# Patient Record
Sex: MALE | Race: WHITE
[De-identification: names, ages, dates, MRNs, and addresses within clinical notes are randomized per-mention and may not be internally consistent; named-entity substitution may affect disease eponyms.]

---

## 2018-02-25 ENCOUNTER — HOSPITAL ENCOUNTER (EMERGENCY)
Dept: HOSPITAL 74 - JER | Age: 83
Discharge: HOME | End: 2018-02-25
Payer: COMMERCIAL

## 2018-02-25 VITALS — TEMPERATURE: 99.7 F

## 2018-02-25 VITALS — DIASTOLIC BLOOD PRESSURE: 60 MMHG | SYSTOLIC BLOOD PRESSURE: 123 MMHG | HEART RATE: 74 BPM

## 2018-02-25 VITALS — BODY MASS INDEX: 24.9 KG/M2

## 2018-02-25 DIAGNOSIS — I25.10: ICD-10-CM

## 2018-02-25 DIAGNOSIS — Z95.1: ICD-10-CM

## 2018-02-25 DIAGNOSIS — E78.00: ICD-10-CM

## 2018-02-25 DIAGNOSIS — I10: ICD-10-CM

## 2018-02-25 DIAGNOSIS — J45.901: Primary | ICD-10-CM

## 2018-02-25 LAB
ALBUMIN SERPL-MCNC: 4.2 G/DL (ref 3.4–5)
ALP SERPL-CCNC: 85 U/L (ref 45–117)
ALT SERPL-CCNC: 20 U/L (ref 12–78)
ANION GAP SERPL CALC-SCNC: 8 MMOL/L (ref 8–16)
AST SERPL-CCNC: 22 U/L (ref 15–37)
BASOPHILS # BLD: 1 % (ref 0–2)
BILIRUB SERPL-MCNC: 0.6 MG/DL (ref 0.2–1)
BUN SERPL-MCNC: 15 MG/DL (ref 7–18)
CALCIUM SERPL-MCNC: 8.2 MG/DL (ref 8.5–10.1)
CHLORIDE SERPL-SCNC: 105 MMOL/L (ref 98–107)
CO2 SERPL-SCNC: 27 MMOL/L (ref 21–32)
CREAT SERPL-MCNC: 1.7 MG/DL (ref 0.7–1.3)
DEPRECATED RDW RBC AUTO: 13.7 % (ref 11.9–15.9)
EOSINOPHIL # BLD: 2.2 % (ref 0–4.5)
GLUCOSE SERPL-MCNC: 90 MG/DL (ref 74–106)
HCT VFR BLD CALC: 39.4 % (ref 35.4–49)
HGB BLD-MCNC: 12.7 GM/DL (ref 11.7–16.9)
LYMPHOCYTES # BLD: 11.2 % (ref 8–40)
MCH RBC QN AUTO: 29.2 PG (ref 25.7–33.7)
MCHC RBC AUTO-ENTMCNC: 32.1 G/DL (ref 32–35.9)
MCV RBC: 91 FL (ref 80–96)
MONOCYTES # BLD AUTO: 17.6 % (ref 3.8–10.2)
NEUTROPHILS # BLD: 68 % (ref 42.8–82.8)
PLATELET # BLD AUTO: 149 K/MM3 (ref 134–434)
PMV BLD: 10.7 FL (ref 7.5–11.1)
POTASSIUM SERPLBLD-SCNC: 4.9 MMOL/L (ref 3.5–5.1)
PROT SERPL-MCNC: 7.8 G/DL (ref 6.4–8.2)
RBC # BLD AUTO: 4.33 M/MM3 (ref 4–5.6)
SODIUM SERPL-SCNC: 140 MMOL/L (ref 136–145)
WBC # BLD AUTO: 6.5 K/MM3 (ref 4–10)

## 2018-02-25 PROCEDURE — 3E0F7GC INTRODUCTION OF OTHER THERAPEUTIC SUBSTANCE INTO RESPIRATORY TRACT, VIA NATURAL OR ARTIFICIAL OPENING: ICD-10-PCS | Performed by: STUDENT IN AN ORGANIZED HEALTH CARE EDUCATION/TRAINING PROGRAM

## 2018-02-25 NOTE — PDOC
Attending Attestation





- Resident


Resident Name: SamuelventuramairaJuvenalNavneet





- ED Attending Attestation


I have performed the following: I have examined & evaluated the patient, The 

case was reviewed & discussed with the resident, I agree w/resident's findings 

& plan, Exceptions are as noted





- HPI


HPI: 





02/25/18 07:35


82y M from [place] , pmhx htn, hl, cad sp cabg, asthmapresents with 2 day 

history of cough, no spuutm production, body aches, fever/chills, congestion, 

leg swelling, hemoptysis, dias, orthopnea. No sick contacts but endorses recent 

travel from [place].  





On exam pt well appearing in no distress


scattered wheez on pulm exam


cardiac: 5/6 systolic murmer, r r r


abd soft nontender


ext: neg edema, no calf tenderness, neg homans





suspect viral syndrome














- Physicial Exam


PE: 





02/25/18 16:18


see above





- Medical Decision Making





02/25/18 09:34


pt feeling improved


abmulating around the ED without any sob or dias


will dc with steroid burst and albuterol


pmd fu


return precautions were discussed





I discussed the physical exam findings, ancillary test results and final 

diagnoses with the patient. I answered all of the patient's questions. The 

patient was satisfied with the care received and felt comfortable with the 

discharge plan and treatment plan. The patient will call their primary care 

physician within 24 hours to arrange follow-up and will return to the Emergency 

Department with any new, persistent or worsening symptoms. 








**Heart Score/ECG Review





- ECG Impressions


Comment:: 





02/25/18 09:34


Twelve-lead EKG was performed and reviewed by me. 


There is normal sinus rhythm with a normal rate. 


Rate of 74


The axis is normal. 


The intervals are normal. 


There is normal R wave progression


Nonspecific T wave abnormality

## 2018-02-25 NOTE — EKG
Test Reason : 

Blood Pressure : ***/*** mmHG

Vent. Rate : 074 BPM     Atrial Rate : 074 BPM

   P-R Int : 190 ms          QRS Dur : 066 ms

    QT Int : 364 ms       P-R-T Axes : 071 034 092 degrees

   QTc Int : 404 ms

 

NORMAL SINUS RHYTHM

NONSPECIFIC T WAVE ABNORMALITY

ABNORMAL ECG

WHEN COMPARED WITH ECG OF 16-APR-2009 15:10,

NO SIGNIFICANT CHANGE WAS FOUND

Confirmed by MD ERNIE, CAMERON ([id number]) on 2/25/2018 6:33:48 PM

 

Referred By:             Confirmed By:CAMERON KLEIN MD

## 2018-02-25 NOTE — PDOC
History of Present Illness





- General


Chief Complaint: Respiratory


Stated Complaint: COUGH


Time Seen by Provider: 02/25/18 07:12





- History of Present Illness


Initial Comments: 





02/25/18 07:12


Mr. Luong is an 83 yo male w/ pmh of HTN, HLD, asthma, and CABG (

approx. 15 years ago) who presents c/o a 2 day history of cough. He denies any 

sputum production but says that he came in because he was up all last night 

coughing. Patient also reports that he has run out of his inhaler medication.





The patient denies chest pain, shortness of breath, headache and dizziness. 

Denies fever, chills, nausea, vomit, diarrhea and constipation. Denies dysuria, 

frequency, urgency and hematuria. 





Allergies: NKDA








Past History





- Past Medical History


Allergies/Adverse Reactions: 


 Allergies











Allergy/AdvReac Type Severity Reaction Status Date / Time


 


No Known Allergies Allergy   Verified 02/25/18 06:59











Home Medications: 


Ambulatory Orders





Albuterol Sulfate Inhaler - [Ventolin Hfa Inhaler -] 1 puff IH PRN PRN #1 

inhaler 02/25/18 


Clopidogrel Bisulfate [Plavix] 75 mg PO DAILY 02/25/18 


Hydrochlorothiazide 12.5 mg PO DAILY 02/25/18 


Montelukast Sodium [Singulair] 10 mg PO DAILY 02/25/18 


Prednisone [Deltasone] 40 mg PO DAILY 4 Days #8 tablet 02/25/18 


Simvastatin 40 mg PO DAILY 02/25/18 











- Suicide/Smoking/Psychosocial Hx


Smoking History: Never smoked


Have you smoked in the past 12 months: No


Information on smoking cessation initiated: No


Hx Alcohol Use: No


Drug/Substance Use Hx: No





**Review of Systems





- Review of Systems


Comments:: 





02/25/18 07:44


GENERAL/CONSTITUTIONAL: No fever or chills. No weakness.


HEAD, EYES, EARS, NOSE AND THROAT: No change in vision. No ear pain or 

discharge. No sore throat.


CARDIOVASCULAR: No chest pain or shortness of breath


RESPIRATORY: +Cough as described. No wheezing or hemoptysis.


GASTROINTESTINAL: No nausea, vomiting, diarrhea or constipation.


GENITOURINARY: No dysuria, frequency, or change in urination.


MUSCULOSKELETAL: No joint or muscle swelling or pain. No neck or back pain.


SKIN: No rash


NEUROLOGIC: No headache, vertigo, loss of consciousness, or change in strength/

sensation.


ENDOCRINE: No increased thirst. No abnormal weight change


HEMATOLOGIC/LYMPHATIC: No anemia, easy bleeding, or history of blood clots.


ALLERGIC/IMMUNOLOGIC: No hives or skin allergy.





*Physical Exam





- Vital Signs


 Last Vital Signs











Temp Pulse Resp BP Pulse Ox


 


 99.7 F H  69   22   159/84   97 


 


 02/25/18 06:57  02/25/18 06:57  02/25/18 06:57  02/25/18 06:57  02/25/18 06:57














- Physical Exam


Comments: 





02/25/18 07:44


GENERAL: Awake, alert, and fully oriented, in no acute distress


HEAD: No signs of trauma, normocephalic, atraumatic 


EYES: PERRLA, EOMI, sclera anicteric, conjunctiva clear


ENT: Auricles normal inspection, hearing grossly normal, nares patent, 

oropharynx clear without


exudates. Moist mucosa


NECK: Normal ROM, supple, no lymphadenopathy, JVD, or masses


LUNGS: +Mild tightness noted in lungs. Speaks full sentences


HEART: Regular rate and rhythm, normal S1 and S2, no murmurs, rubs or gallops, 

peripheral pulses normal and equal bilaterally. 


ABDOMEN: Soft, nontender, normoactive bowel sounds. No guarding, no rebound. No 

masses


EXTREMITIES: Normal inspection, Normal range of motion, no edema. No clubbing 

or cyanosis. 


NEUROLOGICAL: Cranial nerves II through XII grossly intact. Normal speech, 

normal gait, no focal sensorimotor deficits 


SKIN: Warm, Dry, normal turgor, no rashes or lesions noted. 





ED Treatment Course





- LABORATORY


CBC & Chemistry Diagram: 


 02/25/18 07:48





 02/25/18 07:48





Medical Decision Making





- Medical Decision Making





02/25/18 09:23


Mr. Luong is an 83 yo male w/ pmh as described presenting with acute 

viral asthma exacerbation. Patient grabs grossly wnl as below; CXR shows no 

acute pathology. Patient currently oxygenating well after single duo-neb 

treatment.





02/25/18 09:37


Prednisone burst therapy and new Rx for rescue inhaler sent to patient's 

pharmacy. Patient now able to ambulate without difficulty or SOB throughout 

unit. Discharging to home w/ instructions to f/u w/ PCP for further care. 

Patient verbalized understanding and agreement and will comply.





*DC/Admit/Observation/Transfer


Diagnosis at time of Disposition: 


 Cough








- Discharge Dispostion


Disposition: HOME


Condition at time of disposition: Fair





- Prescriptions


Prescriptions: 


Albuterol Sulfate Inhaler - [Ventolin Hfa Inhaler -] 1 puff IH PRN PRN #1 

inhaler


 PRN Reason: Dyspnea


Prednisone [Deltasone] 40 mg PO DAILY 4 Days #8 tablet





- Referrals


Referrals: 


ON STAFF,NOT [Primary Care Provider] - 


Luis Alberto Coe MD [Staff Physician] - 





- Patient Instructions


Printed Discharge Instructions:  DI for Asthma -- Adult, DI for Cough -- Adult


Additional Instructions: 


Please return if any difficulty breathing or continued cough. Follow-up with 

primary care physician as needed for further evaluation. Take prescriptions as 

written for relief from symptoms.





- Post Discharge Activity

## 2019-04-02 ENCOUNTER — HOSPITAL ENCOUNTER (OUTPATIENT)
Dept: HOSPITAL 74 - JER | Age: 84
Setting detail: OBSERVATION
LOS: 1 days | Discharge: HOME | End: 2019-04-03
Attending: INTERNAL MEDICINE | Admitting: INTERNAL MEDICINE
Payer: COMMERCIAL

## 2019-04-02 VITALS — BODY MASS INDEX: 27.3 KG/M2

## 2019-04-02 DIAGNOSIS — R05: ICD-10-CM

## 2019-04-02 DIAGNOSIS — Z88.0: ICD-10-CM

## 2019-04-02 DIAGNOSIS — J06.9: ICD-10-CM

## 2019-04-02 DIAGNOSIS — Z87.891: ICD-10-CM

## 2019-04-02 DIAGNOSIS — I25.10: ICD-10-CM

## 2019-04-02 DIAGNOSIS — Z95.5: ICD-10-CM

## 2019-04-02 DIAGNOSIS — Z95.1: ICD-10-CM

## 2019-04-02 DIAGNOSIS — N40.0: ICD-10-CM

## 2019-04-02 DIAGNOSIS — J45.909: ICD-10-CM

## 2019-04-02 DIAGNOSIS — I48.91: Primary | ICD-10-CM

## 2019-04-02 DIAGNOSIS — E78.5: ICD-10-CM

## 2019-04-02 DIAGNOSIS — R79.89: ICD-10-CM

## 2019-04-02 DIAGNOSIS — N18.3: ICD-10-CM

## 2019-04-02 DIAGNOSIS — I12.9: ICD-10-CM

## 2019-04-02 LAB
ALBUMIN SERPL-MCNC: 3.7 G/DL (ref 3.4–5)
ALP SERPL-CCNC: 98 U/L (ref 45–117)
ALT SERPL-CCNC: 25 U/L (ref 13–61)
ANION GAP SERPL CALC-SCNC: 7 MMOL/L (ref 8–16)
AST SERPL-CCNC: 24 U/L (ref 15–37)
BASOPHILS # BLD: 1 % (ref 0–2)
BILIRUB SERPL-MCNC: 0.4 MG/DL (ref 0.2–1)
BUN SERPL-MCNC: 13 MG/DL (ref 7–18)
CALCIUM SERPL-MCNC: 9.1 MG/DL (ref 8.5–10.1)
CHLORIDE SERPL-SCNC: 104 MMOL/L (ref 98–107)
CO2 SERPL-SCNC: 26 MMOL/L (ref 21–32)
CREAT SERPL-MCNC: 1.4 MG/DL (ref 0.55–1.3)
DEPRECATED RDW RBC AUTO: 13.2 % (ref 11.9–15.9)
EOSINOPHIL # BLD: 2 % (ref 0–4.5)
GLUCOSE SERPL-MCNC: 92 MG/DL (ref 74–106)
HCT VFR BLD CALC: 39.4 % (ref 35.4–49)
HGB BLD-MCNC: 13.2 GM/DL (ref 11.7–16.9)
INR BLD: 1.03 (ref 0.83–1.09)
LYMPHOCYTES # BLD: 28.8 % (ref 8–40)
MAGNESIUM SERPL-MCNC: 2.4 MG/DL (ref 1.8–2.4)
MCH RBC QN AUTO: 29.6 PG (ref 25.7–33.7)
MCHC RBC AUTO-ENTMCNC: 33.6 G/DL (ref 32–35.9)
MCV RBC: 88.2 FL (ref 80–96)
MONOCYTES # BLD AUTO: 18.7 % (ref 3.8–10.2)
NEUTROPHILS # BLD: 49.5 % (ref 42.8–82.8)
PLATELET # BLD AUTO: 194 K/MM3 (ref 134–434)
PMV BLD: 10.1 FL (ref 7.5–11.1)
POTASSIUM SERPLBLD-SCNC: 4.3 MMOL/L (ref 3.5–5.1)
PROT SERPL-MCNC: 7.4 G/DL (ref 6.4–8.2)
PT PNL PPP: 12.1 SEC (ref 9.7–13)
RBC # BLD AUTO: 4.47 M/MM3 (ref 4–5.6)
SODIUM SERPL-SCNC: 136 MMOL/L (ref 136–145)
WBC # BLD AUTO: 7.7 K/MM3 (ref 4–10)

## 2019-04-02 PROCEDURE — 3E0333Z INTRODUCTION OF ANTI-INFLAMMATORY INTO PERIPHERAL VEIN, PERCUTANEOUS APPROACH: ICD-10-PCS | Performed by: INTERNAL MEDICINE

## 2019-04-02 PROCEDURE — 3E033GC INTRODUCTION OF OTHER THERAPEUTIC SUBSTANCE INTO PERIPHERAL VEIN, PERCUTANEOUS APPROACH: ICD-10-PCS | Performed by: INTERNAL MEDICINE

## 2019-04-02 PROCEDURE — 3E0337Z INTRODUCTION OF ELECTROLYTIC AND WATER BALANCE SUBSTANCE INTO PERIPHERAL VEIN, PERCUTANEOUS APPROACH: ICD-10-PCS | Performed by: INTERNAL MEDICINE

## 2019-04-02 PROCEDURE — 3E0F7GC INTRODUCTION OF OTHER THERAPEUTIC SUBSTANCE INTO RESPIRATORY TRACT, VIA NATURAL OR ARTIFICIAL OPENING: ICD-10-PCS | Performed by: INTERNAL MEDICINE

## 2019-04-02 PROCEDURE — G0378 HOSPITAL OBSERVATION PER HR: HCPCS

## 2019-04-02 RX ADMIN — IPRATROPIUM BROMIDE AND ALBUTEROL SULFATE SCH AMP: .5; 3 SOLUTION RESPIRATORY (INHALATION) at 20:40

## 2019-04-02 RX ADMIN — IPRATROPIUM BROMIDE AND ALBUTEROL SULFATE SCH AMP: .5; 3 SOLUTION RESPIRATORY (INHALATION) at 16:31

## 2019-04-02 RX ADMIN — DILTIAZEM HYDROCHLORIDE SCH MG: 30 TABLET, FILM COATED ORAL at 18:27

## 2019-04-02 RX ADMIN — IPRATROPIUM BROMIDE AND ALBUTEROL SULFATE SCH AMP: .5; 3 SOLUTION RESPIRATORY (INHALATION) at 13:13

## 2019-04-02 NOTE — HP
CHIEF COMPLAINT:


sob


PCP:





HISTORY OF PRESENT ILLNESS:


This is a 83 year old male from [place], visiting, past medical history 

significant for CABG, CAD, HTN, asthma, former smoker, who presents with 

shortness of breath and productive cough with yellow sputum production x3 days. 

Found to be in atrial fibrillation with RVR in ER.   Denies HA< blurry vision, 

fever, chills, n, v, palpitations, orthopnea, edema, melena. 








Recent Travel:


yes form  [place]


PAST MEDICAL HISTORY:


CABG many years ago, HTN, HLD, asthma


PAST SURGICAL HISTORY:


CABG


Social History:


Smoking:quit 20yrs ago; smoked >40yrs


Alcohol:no


Drugs: no





Family History:


Allergies





Penicillins Allergy (Verified 04/02/19 11:00)


 








HOME MEDICATIONS:


 Home Medications











 Medication  Instructions  Recorded


 


Albuterol Sulfate Inhaler - 1 puff IH PRN PRN #1 inhaler 02/25/18





[Ventolin Hfa Inhaler -]  


 


Clopidogrel Bisulfate [Plavix] 75 mg PO DAILY 02/25/18


 


Montelukast Sodium [Singulair] 10 mg PO DAILY 02/25/18


 


Simvastatin 40 mg PO DAILY 02/25/18








REVIEW OF SYSTEMS


CONSTITUTIONAL: 


Absent:  fever, chills, diaphoresis, generalized weakness, malaise, loss of 

appetite, weight change


HEENT: 


Absent:  rhinorrhea, nasal congestion, throat pain, throat swelling, difficulty 

swallowing, mouth swelling, ear pain, eye pain, visual changes


CARDIOVASCULAR: 


Absent: chest pain, syncope, palpitations, irregular heart rate, lightheadedness

, peripheral edema


RESPIRATORY: 


Absent: cough, shortness of breath, dyspnea with exertion, orthopnea, wheezing, 

stridor, hemoptysis


GASTROINTESTINAL:


Absent: abdominal pain, abdominal distension, nausea, vomiting, diarrhea, 

constipation, melena, hematochezia


GENITOURINARY: 


Absent: dysuria, frequency, urgency, hesitancy, hematuria, flank pain, genital 

pain


MUSCULOSKELETAL: 


Absent: myalgia, arthralgia, joint swelling, back pain, neck pain


SKIN: 


Absent: rash, itching, pallor


HEMATOLOGIC/IMMUNOLOGIC: 


Absent: easy bleeding, easy bruising, lymphadenopathy, frequent infections


ENDOCRINE:


Absent: unexplained weight gain, unexplained weight loss, heat intolerance, 

cold intolerance


NEUROLOGIC: 


Absent: headache, focal weakness or paresthesias, dizziness, unsteady gait, 

seizure, mental status changes, bladder or bowel incontinence


PSYCHIATRIC: 


Absent: anxiety, depression, suicidal or homicidal ideation, hallucinations.








PHYSICAL EXAMINATION


 Vital Signs - 24 hr











  04/02/19 04/02/19 04/02/19





  11:01 13:24 15:20


 


Temperature 99.1 F  


 


Pulse Rate 83  


 


Pulse Rate [  80 112 H





Right Radial]   


 


Respiratory 17 16 14





Rate   


 


Blood Pressure 127/56 L  


 


Blood Pressure  185/83 H 135/74





[Left Arm]   


 


O2 Sat by Pulse 97 100 98





Oximetry (%)   














  04/02/19





  15:34


 


Temperature 


 


Pulse Rate 


 


Pulse Rate [ 90





Right Radial] 


 


Respiratory 





Rate 


 


Blood Pressure 


 


Blood Pressure 





[Left Arm] 


 


O2 Sat by Pulse 





Oximetry (%) 











GENERAL: Awake, alert, and fully oriented, in no acute distress.


HEAD: Normal with no signs of trauma.


EYES: Pupils equal, round and reactive to light, extraocular movements intact, 

sclera anicteric, conjunctiva clear. No lid lag.


EARS, NOSE, THROAT: Ears normal, nares patent, oropharynx clear without 

exudates. Moist mucous membranes.


NECK: Normal range of motion, supple without lymphadenopathy, JVD, or masses.


LUNGS: decrease breath sounds; mild wheeze


HEART: Regular rate and rhythm, normal S1 and S2 +murmur, rub or gallop.


ABDOMEN: Soft, nontender, not distended, normoactive bowel sounds, no guarding, 

no rebound, no masses.  No hepatomegaly or  splenomegaly. 


MUSCULOSKELETAL: Normal range of motion at all joints. No bony deformities or 

tenderness. No CVA tenderness.


UPPER EXTREMITIES: 2+ pulses, warm, well-perfused. No cyanosis. No clubbing. No 

peripheral edema.


LOWER EXTREMITIES: 2+ pulses, warm, well-perfused. No calf tenderness. No 

peripheral edema. 


NEUROLOGICAL:  Cranial nerves II-XII intact. Normal speech. 











PSYCHIATRIC: Cooperative. Good eye contact. Appropriate mood and affect.


SKIN: Warm, dry, normal turgor, no rashes or lesions noted, normal capillary 

refill. 


 Laboratory Results - last 24 hr











  04/02/19 04/02/19 04/02/19





  12:43 12:43 12:43


 


WBC  7.7  


 


RBC  4.47  


 


Hgb  13.2  


 


Hct  39.4  


 


MCV  88.2  


 


MCH  29.6  


 


MCHC  33.6  


 


RDW  13.2  


 


Plt Count  194  D  


 


MPV  10.1  


 


Absolute Neuts (auto)  3.8  


 


Neutrophils %  49.5  D  


 


Lymphocytes %  28.8  D  


 


Monocytes %  18.7 H  


 


Eosinophils %  2.0  


 


Basophils %  1.0  


 


Nucleated RBC %  0  


 


PT with INR   12.10 


 


INR   1.03 


 


Sodium    136


 


Potassium    4.3


 


Chloride    104


 


Carbon Dioxide    26


 


Anion Gap    7 L


 


BUN    13


 


Creatinine    1.4 H


 


Creat Clearance w eGFR    48.40


 


Random Glucose    92


 


Calcium    9.1


 


Magnesium    2.4


 


Total Bilirubin    0.4


 


AST    24


 


ALT    25


 


Alkaline Phosphatase    98


 


Creatine Kinase    307


 


Creatine Kinase Index    0.6


 


CK-MB (CK-2)    2.1


 


Troponin I    0.02


 


Total Protein    7.4


 


Albumin    3.7











ASSESSMENT/PLAN:


This is a 83 year old male with a history of CABG, HTN, HLD, who presents with 

sob/cough with sputum production, found to be in rapid atrial fibrillation with 

RVR.





#new onset atrial fibrillation with RVR


-s/p IV cardizem


-will give po cardizem for now; 


-get echo to eval LV function; then decided which rate control to give


-start heparin drip for [id number] score 3


-PTT


-TSH


-cardiac monitoring


-cardio consult





#sob; possible sec to COPD? /asthma exacerbation from URI?


-duonebs


-steroids


-monitor o2


-hold off on antibiotics


-cxr


-influenza swap


-urine antigens


-sptutm culture





#HTN: 


-on hydralizine 25 bid at home


-lisinopril 20qd





#CKD hx; 


-cr 1.7 here; last year recorded here was 1.7; 


-not acute





#hx cabg





VTE ppl a; on heparin drip





GIppl ;zant





Disposition; tele


























Visit type





- Emergency Visit


Emergency Visit: Yes


Care time: The patient presented to the Emergency Department on the above date 

and was hospitalized for further evaluation of their emergent condition.





- New Patient


This patient is new to me today: Yes


Date on this admission: 04/02/19





- Critical Care


Critical Care patient: No

## 2019-04-02 NOTE — PDOC
History of Present Illness





- General


Chief Complaint: Respiratory


Stated Complaint: ASTHMA


Time Seen by Provider: 04/02/19 12:00


History Source: Patient


Exam Limitations: No Limitations





- History of Present Illness


Initial Comments: 





04/02/19 12:47


Patient is a 83F with history of HTN, HLD, asthma, CABG 15 years ago here today 

complaining of cough and shortness of breath for the past 3 days. Denies fevers

, chills, nausea, vomiting. Denies chest pain, palpitations. Denies history of 

afib, not on blood thinners. Denies leg swelling. [place] 

[place]. Takes plavix. Endorses sputum change. No PE history. No leg swelling.





Past History





- Past Medical History


Allergies/Adverse Reactions: 


 Allergies











Allergy/AdvReac Type Severity Reaction Status Date / Time


 


Penicillins Allergy   Verified 04/02/19 11:00











Home Medications: 


Ambulatory Orders





Albuterol Sulfate Inhaler - [Ventolin Hfa Inhaler -] 1 puff IH PRN PRN #1 

inhaler 02/25/18 


Clopidogrel Bisulfate [Plavix] 75 mg PO DAILY 02/25/18 


Hydrochlorothiazide 12.5 mg PO DAILY 02/25/18 


Montelukast Sodium [Singulair] 10 mg PO DAILY 02/25/18 


Prednisone [Deltasone] 40 mg PO DAILY 4 Days #8 tablet 02/25/18 


Simvastatin 40 mg PO DAILY 02/25/18 








Cardiac Disorders: Yes


COPD: No


HTN: Yes


Hypercholesterolemia: Yes





- Suicide/Smoking/Psychosocial Hx


Smoking History: Former smoker


Have you smoked in the past 12 months: No


Information on smoking cessation initiated: Yes


Hx Alcohol Use: No


Drug/Substance Use Hx: No


Substance Use Type: None





**Review of Systems





- Review of Systems


Able to Perform ROS?: Yes


Comments:: 





04/02/19 12:50


GENERAL/CONSTITUTIONAL: No fever or chills. No weakness.


HEAD, EYES, EARS, NOSE AND THROAT: No change in vision. NNo sore throat.


CARDIOVASCULAR: No chest pain or shortness of breath


RESPIRATORY: +cough, +wheezing, no hemoptysis.


GASTROINTESTINAL: No nausea, vomiting, diarrhea or constipation.


GENITOURINARY: No dysuria, frequency, or change in urination.


MUSCULOSKELETAL: No joint or muscle swelling or pain. No neck or back pain.


SKIN: No rash


NEUROLOGIC: No headache, vertigo, loss of consciousness, or change in strength/

sensation.


ENDOCRINE: No increased thirst. No abnormal weight change


HEMATOLOGIC/LYMPHATIC: No anemia, easy bleeding, or history of blood clots.


ALLERGIC/IMMUNOLOGIC: No hives or skin allergy.








*Physical Exam





- Vital Signs


 Last Vital Signs











Temp Pulse Resp BP Pulse Ox


 


 99.1 F   83   17   127/56 L  97 


 


 04/02/19 11:01  04/02/19 11:01  04/02/19 11:01  04/02/19 11:01 04/02/19 11:01














- Physical Exam


Comments: 





04/02/19 12:51





GENERAL: Awake, alert, and fully oriented, in no acute distress


HEAD: No signs of trauma, normocephalic, atraumatic


EYES: PERRLA, EOMI, sclera anicteric, conjunctiva clear


ENT: Auricles normal inspection, hearing grossly normal, nares patent, 

oropharynx clear without


exudates. Moist mucosa


NECK: Normal ROM, supple, no lymphadenopathy, JVD, or masses


LUNGS: No distress, speaks full sentences, wheezing bilaterally


HEART: Regular rate and rhythm, normal S1 and S2, no murmurs, rubs or gallops, 

peripheral pulses normal and equal bilaterally.


ABDOMEN: Soft, nontender, normoactive bowel sounds.  No guarding, no rebound.  

No masses


EXTREMITIES: Normal inspection, Normal range of motion, no edema.  No clubbing 

or cyanosis.


NEUROLOGICAL: Cranial nerves II through XII grossly intact.  Normal speech, no 

focal sensorimotor deficits


SKIN: Warm, Dry, normal turgor, no rashes or lesions noted.








ED Treatment Course





- LABORATORY


CBC & Chemistry Diagram: 


 04/02/19 12:43





 04/02/19 12:43





- RADIOLOGY


Radiology Studies Ordered: 














 Category Date Time Status


 


 CHEST PA & LAT [RAD] Stat Radiology  04/02/19 12:07 Ordered














Medical Decision Making





- Medical Decision Making





04/02/19 12:51


Patient is 83M with history of HTN, HLD, asthma and CABG here today complaining 

of cough and shortness of breath. Vitals normal and stable. DDx includes, but 

is not limited to: copd, asthma, pneumonia, chf. Likely copd/asthma 

exacerbation. Will treat with duonebs and steroids.





EKG shows afib with rate of 73. No st elevations/depressions. Normal axis. 

Normal intervals. No significant t wave abnormalities.





No history of afib, medication list confirmed with pharmacy. Will likely 

require obs.


04/02/19 14:39


CBC reassuring.


CMP reassuring.


Trop negative.


CXR clear.


Patient reassessed, lungs now clear, feeling better.





Will admit for new-onset afib.


04/02/19 15:25


On reassessment, . BP 130s/70s. Given 10mg dilt, HR now in 80s, bps 

stable. Given po chaser. 





Case discussed with Dr Mora, accepted to tele.





*DC/Admit/Observation/Transfer


Diagnosis at time of Disposition: 


 New onset a-fib, Atrial fibrillation with RVR








- Discharge Dispostion


Condition at time of disposition: Stable


Decision to Admit order: Yes





- Referrals


Referrals: 


ON STAFF,NOT [Primary Care Provider] - 





- Patient Instructions





- Post Discharge Activity

## 2019-04-02 NOTE — PDOC
Attending Attestation





- Resident


Resident Name: NildaYaniv





- ED Attending Attestation


I have performed the following: I have examined & evaluated the patient, The 

case was reviewed & discussed with the resident, I agree w/resident's findings 

& plan, Exceptions are as noted





- HPI


HPI: 





04/02/19 12:31


Pt is an 82 yo M h/o CAD s/p PCI, stent, CABG, Asthma, BPH who present with a 

complaint of cough which has been present for the past 3 days


No chest pain, fevers, chills


No palpitations


Pt denies orthopnea


Pt denies dyspnea on exertion


Pt travelled from [place] 1 month ago





04/02/19 12:48








- Physicial Exam


PE: 





04/02/19 12:29


GENERAL: The patient is in no acute distress.


ENT: Ears normal, nares patent, oropharynx clear without exudates.  Moist 

mucous membranes.  


NECK: Normal range of motion, supple, no nuchal rigidity   


LUNGS: Breath sounds equal, clear to auscultation bilaterally.  No wheezes, and 

no crackles.


HEART:Regular rate and rhythm, normal S1 and S2 without murmur, rub or gallop.


ABDOMEN: Soft, nontender, normoactive bowel sounds.   


EXTREMITIES: Normal range of motion, no edema.   


NEUROLOGICAL: Cranial nerves II through XII grossly intact.  Normal speech.  No 

focal neurological deficits. 


SKIN: Warm, Dry, normal turgor, no rashes or lesions noted.





- Medical Decision Making





04/02/19 12:29


EKG: Afib rate of 73 bpm, axis nml, no st elevation or depression, t waves 

upright


04/02/19 12:30





04/02/19 13:44


 Laboratory Tests











  04/02/19 04/02/19





  12:43 12:43


 


WBC  7.7 


 


Hgb  13.2 


 


Hct  39.4 


 


Plt Count  194  D 


 


BUN   13


 


Creatinine   1.4 H


 


Creatine Kinase   307


 


Troponin I   0.02








New onset Afib


Upper respiratory infection


Will place on observation


CXR- pending


04/02/19 15:45


EKG - Afib rate of 102 bpm, axis nml, intervals nml, no st elevation or 

depression


Cardizem 10mg IV given


Cardizem po given


Cardiology consult

## 2019-04-02 NOTE — PN
Teaching Attending Note


Name of Resident: Oliva Mora





ATTENDING PHYSICIAN STATEMENT





I saw and evaluated the patient.


I reviewed the resident's note and discussed the case with the resident.


I agree with the resident's findings and plan as documented with exceptions 

below.





Nephrologist: Dr. Neel Anderson Ph: [phone] and [phone]


PCP: Dr. Seda Zhu Ph: [phone]





SUBJECTIVE:


83 yom, from [place], with PMHx of HTN, HLD, COPD/asthma, ex-heavy smoker, 

CAD s/p CABG 15 years ago (No hospitalization since per patient),?CKD stage II 

comes with 3 days of dry non productive cough, shortness of breath and URI like 

symptoms. Denies any fevers, chills, chest pain, palpitations, dizziness, 

abdominal or urinary symptoms. 


patient unsure if has been on a blood thinner in the past.


12 point ROS done, neg for prior headache, bleed or dark or bloody stools. 

Feels better currently. 


Was noted with Afib 73 in the ED, ?new onset. -130s in the ED, currently 

80s





OBJECTIVE:


 Vital Signs











 Period  Temp  Pulse  Resp  BP Sys/May  Pulse Ox


 


 Last 24 Hr  99.1 F    14-17  127-185/56-83  








 Intake & Output











 03/30/19 03/31/19 04/01/19 04/02/19





 23:59 23:59 23:59 23:59


 


Weight    140 lb











GENERAL: Awake, alert, and fully oriented, in no acute distressm, noted with 

coughing paroxysms during the exam.


HEAD: Normal with no signs of trauma.


EYES: Pupils equal, round and reactive to light, extraocular movements intact, 

sclera anicteric, conjunctiva clear. No lid lag.


EARS, NOSE, THROAT: Ears normal, nares patent, oropharynx clear without 

exudates. Moist mucous membranes.


NECK: Normal range of motion, supple, no JVD visualized


LUNGS: decreased air entry bilaterally, no wheezing or rales appreciated


HEART: S1S2 irregularly irregular in left parasternal region


ABDOMEN: Soft, nontender, not distended, normoactive bowel sounds, no guarding, 

no rebound, no masses.  


MUSCULOSKELETAL: Normal range of motion at all joints. No bony deformities or 

tenderness. No CVA tenderness.


UPPER EXTREMITIES: 2+ pulses, warm, well-perfused. No cyanosis. No clubbing. No 

peripheral edema.


LOWER EXTREMITIES: 2+ pulses, warm, well-perfused. No calf tenderness. No 

peripheral edema. 


NEUROLOGICAL:  AAOx3, power 5/5 sensation intact to lightCranial nerves II-XII 

intact. Normal speech. Normal gait.


PSYCHIATRIC: Cooperative. Good eye contact. Appropriate mood and affect.


SKIN: Warm, dry, normal turgor, no rashes or lesions noted, normal capillary 

refill. 





 Home Medications











 Medication  Instructions  Recorded


 


Albuterol Sulfate Inhaler - 1 - 2 inh PO Q4H PRN 04/02/19





[Ventolin Hfa Inhaler -]  


 


Amlodipine Besylate 5 mg PO DAILY 04/02/19


 


Clopidogrel Bisulfate [Plavix] 75 mg PO DAILY 04/02/19


 


Hydralazine HCl 25 mg PO BID 04/02/19


 


Montelukast Sodium [Singulair] 10 mg PO DAILY 04/02/19


 


Simvastatin 40 mg PO DAILY 04/02/19


 


Tamsulosin HCl [Flomax] 0.4 mg PO DAILY 04/02/19








Active Medications





Albuterol/Ipratropium (Duoneb -)  1 amp NEB RQID LONG


Atorvastatin Calcium (Lipitor -)  20 mg PO HS LONG


Diltiazem HCl (Cardizem -)  30 mg PO Q6HPO [facility]


Heparin Sodium (Porcine) (Heparin -)  1,000 unit IVPUSH PRN PRN


   PRN Reason: Heparin


Heparin Sodium (Porcine) (Heparin -)  5,000 unit IVPUSH PRN PRN


   PRN Reason: Heparin


HEPARIN SOD,PORK IN 0.45% NACL (Heparin-1/2ns 25,000 Units/500)  25,000 unit in 

500 mls @ 16 mls/hr IVPB TITR LONG; Protocol


Levalbuterol HCl (Xopenex)  0.31 mg IH Q8H PRN


   PRN Reason: ASTHMA


Montelukast Sodium (Singulair -)  10 mg PO DAILY LONG


Prednisone (Deltasone -)  40 mg PO DAILY [facility]





 Laboratory Results - last 24 hr











  04/02/19 04/02/19 04/02/19





  12:43 12:43 12:43


 


WBC  7.7  


 


RBC  4.47  


 


Hgb  13.2  


 


Hct  39.4  


 


MCV  88.2  


 


MCH  29.6  


 


MCHC  33.6  


 


RDW  13.2  


 


Plt Count  194  D  


 


MPV  10.1  


 


Absolute Neuts (auto)  3.8  


 


Neutrophils %  49.5  D  


 


Lymphocytes %  28.8  D  


 


Monocytes %  18.7 H  


 


Eosinophils %  2.0  


 


Basophils %  1.0  


 


Nucleated RBC %  0  


 


PT with INR   12.10 


 


INR   1.03 


 


Sodium    136


 


Potassium    4.3


 


Chloride    104


 


Carbon Dioxide    26


 


Anion Gap    7 L


 


BUN    13


 


Creatinine    1.4 H


 


Creat Clearance w eGFR    48.40


 


Random Glucose    92


 


Calcium    9.1


 


Magnesium    2.4


 


Total Bilirubin    0.4


 


AST    24


 


ALT    25


 


Alkaline Phosphatase    98


 


Creatine Kinase    307


 


Creatine Kinase Index    0.6


 


CK-MB (CK-2)    2.1


 


Troponin I    0.02


 


Total Protein    7.4


 


Albumin    3.7








Telemetry currently Afib 80s (HR 110s-130s around 2 PM, aflutter/fibrillation


EKG 1 Afib 73, no acute ST-T changes


EKG 2 Afib 100s, otherwise unchanged





CXR images reviewed by me, no acute process, follow up official read





ASSESSMENT AND PLAN:


83 yom, from [place], with PMHx of HTN, HLD, COPD/asthma, ex-heavy smoker, 

CAD s/p CABG 15 years ago (No hospitalization since per patient),?CKD stage II 

admitted with COPD exacerbation, found with Afib with RVR





-Acute COPD exacerbation in the setting of recent URI like symptoms


-Afib with RVR, ?New onset


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


-CAD s/p CABG 15 years ago


-HTN


-HLD


-COPD/asthma


-Ex tobacco use





Plan:


Prednisone, xopenex prn. 


Unclear if afib is new. Cardizem 30mg PO q6h. Hold other anti-htn medications 

to allow room for rate controlling agents. 2D echo


CHADSVasc2 of 4. Patient on plavix but reports no hospitalization or concerns 

since his CABG 15 years ago, no recent stents. 


Given CKD, will place on heparin drip and likely transition to NOAC if Echo 

confirms non valvular Afib. 


Hold plavix tonight. Cardiology consult and follow up for additonal AC 

recommendations. 


Patient's nephrologist Dr. Shaikh's office contacted, ([phone]). Will be 

available tomorrow 11 AM. Will retrieve more information in AM


DVTPPX as above


Dispo admit to obs, d/c in 24 hours if rate controlled, breathing better and no 

new events.


Plan discussed with patient in detail, all questions answered.


total admit time 55 min.

## 2019-04-02 NOTE — CON.CARD
Consult


Consult Specialty:: cardiology


Reason for Consultation:: ?new-onset AF





- History of Present Illness


History of Present Illness: 





Pt is an 84 yo man with h/o CAD s/p PCI, stent, CABG, Asthma, BPH who present 

with a complaint of cough which has been present for the past 3 days


No chest pain, fevers, chills


No palpitations


Pt denies orthopnea


Pt denies dyspnea on exertion


Pt travelled from [place] 1 month ago





Pt was noted to be in AF (no prior hx of the arrhythmia); HR later accelerated.





- History Source


History Provided By: Patient, Medical Record





- Past Medical History


Cardio/Vascular: Yes: CAD, HTN


Pulmonary: Yes: Asthma





- Past Surgical History


Past Surgical History: Yes: CABG, Stent





- Alcohol/Substance Use


Hx Alcohol Use: No





- Smoking History


Smoking history: Former smoker


Have you smoked in the past 12 months: No





Home Medications





- Allergies


Allergies/Adverse Reactions: 


 Allergies











Allergy/AdvReac Type Severity Reaction Status Date / Time


 


Penicillins Allergy   Verified 04/02/19 11:00














- Home Medications


Home Medications: 


Ambulatory Orders





Albuterol Sulfate Inhaler - [Ventolin Hfa Inhaler -] 1 - 2 inh PO Q4H PRN 04/02/ 19 


Amlodipine Besylate 5 mg PO DAILY 04/02/19 


Clopidogrel Bisulfate [Plavix] 75 mg PO DAILY 04/02/19 


Hydralazine HCl 25 mg PO BID 04/02/19 


Montelukast Sodium [Singulair] 10 mg PO DAILY 04/02/19 


Simvastatin 40 mg PO DAILY 04/02/19 


Tamsulosin HCl [Flomax] 0.4 mg PO DAILY 04/02/19 











Family Disease History





- Family Disease History


Family History: Denies





- Risk Factors


Known Risk Factors: Yes: Age, Gender, Hypercholesterolemia, Hypertension


Vital Signs: 


 Vital Signs











Temperature  99.1 F   04/02/19 11:01


 


Pulse Rate  80   04/02/19 18:26


 


Respiratory Rate  20   04/02/19 18:26


 


Blood Pressure  110/56 L  04/02/19 18:26


 


O2 Sat by Pulse Oximetry (%)  96   04/02/19 18:26














- Other Data


Labs, Other Data: 


 CBC, BMP





 04/02/19 12:43 





 04/02/19 12:43 





 INR, PTT











INR  1.03  (0.83-1.09)   04/02/19  12:43    








 Troponin, BNP











  04/02/19





  12:43


 


Troponin I  0.02








 Troponin, BNP











  04/02/19





  12:43


 


Troponin I  0.02








 Abnormal Lab Results











  04/02/19 04/02/19 04/02/19





  12:43 12:43 23:05


 


Monocytes %  18.7 H  


 


Anion Gap   7 L 


 


Creatinine   1.4 H 


 


B-Natriuretic Peptide    3609.3 H











Echo: Pending





Imaging





- Results


Chest X-ray: Image Reviewed (no acute infiltrate)


EKG: Image Reviewed





Problem List





- Problems


(1) HTN (hypertension)


Assessment/Plan: 


on diltiazem for HR and BP control.


F/u ECHO for LVEF, Wall thickness.


Code(s): I10 - ESSENTIAL (PRIMARY) HYPERTENSION   





(2) New onset a-fib


Assessment/Plan: 


On diltiazem for HR control.


On IV heparin for anticoagulation.


ECHO for LVEF, wall motion and thickness, chamber sizes, valve status.


1st TNI < 0.02.


F/u EKG; telemetry.


Code(s): I48.91 - UNSPECIFIED ATRIAL FIBRILLATION   





(3) Cough


Code(s): R05 - COUGH   





(4) Asthma


Assessment/Plan: 


remote history


Code(s): J45.909 - UNSPECIFIED ASTHMA, UNCOMPLICATED   





(5) Hyperlipidemia


Assessment/Plan: 


on statin.


F/u lipid profile.


Code(s): E78.5 - HYPERLIPIDEMIA, UNSPECIFIED

## 2019-04-03 VITALS — SYSTOLIC BLOOD PRESSURE: 137 MMHG | TEMPERATURE: 98.2 F | DIASTOLIC BLOOD PRESSURE: 63 MMHG

## 2019-04-03 VITALS — HEART RATE: 68 BPM

## 2019-04-03 LAB
ANION GAP SERPL CALC-SCNC: 8 MMOL/L (ref 8–16)
APPEARANCE UR: CLEAR
APPEARANCE UR: CLEAR
APTT BLD: 93.2 SECONDS (ref 25.2–36.5)
BACTERIA #/AREA URNS HPF: 1.4 /HPF
BASOPHILS # BLD: 0.1 % (ref 0–2)
BILIRUB UR STRIP.AUTO-MCNC: NEGATIVE MG/DL
BILIRUB UR STRIP.AUTO-MCNC: NEGATIVE MG/DL
BUN SERPL-MCNC: 30 MG/DL (ref 7–18)
CALCIUM SERPL-MCNC: 8.7 MG/DL (ref 8.5–10.1)
CASTS #/AREA URNS LPF: 4 /HPF (ref 0–8)
CHLORIDE SERPL-SCNC: 105 MMOL/L (ref 98–107)
CHOLEST SERPL-MCNC: 132 MG/DL (ref 50–200)
CO2 SERPL-SCNC: 24 MMOL/L (ref 21–32)
COLOR UR: YELLOW
COLOR UR: YELLOW
CREAT SERPL-MCNC: 2 MG/DL (ref 0.55–1.3)
CREAT UR-MCNC: 246 MG/DL (ref 20–320)
DEPRECATED RDW RBC AUTO: 13.2 % (ref 11.9–15.9)
EOSINOPHIL # BLD: 0 % (ref 0–4.5)
EPITH CASTS URNS QL MICRO: 0.5 /HPF (ref 0–5)
GLUCOSE SERPL-MCNC: 154 MG/DL (ref 74–106)
HCT VFR BLD CALC: 36.7 % (ref 35.4–49)
HDLC SERPL-MCNC: 43 MG/DL (ref 40–60)
HGB BLD-MCNC: 12.1 GM/DL (ref 11.7–16.9)
INR BLD: 1.1 (ref 0.83–1.09)
INR BLD: 1.13 (ref 0.83–1.09)
KETONES UR QL STRIP: (no result)
KETONES UR QL STRIP: (no result)
LEUKOCYTE ESTERASE UR QL STRIP.AUTO: NEGATIVE
LEUKOCYTE ESTERASE UR QL STRIP.AUTO: NEGATIVE
LYMPHOCYTES # BLD: 14.8 % (ref 8–40)
MAGNESIUM SERPL-MCNC: 2.2 MG/DL (ref 1.8–2.4)
MCH RBC QN AUTO: 29.3 PG (ref 25.7–33.7)
MCHC RBC AUTO-ENTMCNC: 33 G/DL (ref 32–35.9)
MCV RBC: 88.7 FL (ref 80–96)
MONOCYTES # BLD AUTO: 2.2 % (ref 3.8–10.2)
NEUTROPHILS # BLD: 82.9 % (ref 42.8–82.8)
NITRITE UR QL STRIP: NEGATIVE
NITRITE UR QL STRIP: NEGATIVE
PH UR: 5 [PH] (ref 5–8)
PH UR: 5 [PH] (ref 5–8)
PHOSPHATE SERPL-MCNC: 3.7 MG/DL (ref 2.5–4.9)
PLATELET # BLD AUTO: 201 K/MM3 (ref 134–434)
PMV BLD: 11.1 FL (ref 7.5–11.1)
POTASSIUM SERPLBLD-SCNC: 4.6 MMOL/L (ref 3.5–5.1)
PROT UR QL STRIP: (no result)
PROT UR QL STRIP: NEGATIVE
PT PNL PPP: 13 SEC (ref 9.7–13)
PT PNL PPP: 13.4 SEC (ref 9.7–13)
RATIO URIN PROTEIN/URIN CREAT: 0.07 MG/DL
RBC # BLD AUTO: 2 /HPF (ref 0–4)
RBC # BLD AUTO: 4.14 M/MM3 (ref 4–5.6)
SODIUM SERPL-SCNC: 137 MMOL/L (ref 136–145)
SP GR UR: 1.02 (ref 1.01–1.03)
SP GR UR: 1.02 (ref 1.01–1.03)
TRIGL SERPL-MCNC: 75 MG/DL (ref 0–150)
UROBILINOGEN UR STRIP-MCNC: 0.2 MG/DL (ref 0.2–1)
UROBILINOGEN UR STRIP-MCNC: 1 MG/DL (ref 0.2–1)
WBC # BLD AUTO: 6.8 K/MM3 (ref 4–10)
WBC # UR AUTO: 1 /HPF (ref 0–5)

## 2019-04-03 RX ADMIN — DILTIAZEM HYDROCHLORIDE SCH MG: 30 TABLET, FILM COATED ORAL at 01:05

## 2019-04-03 RX ADMIN — DILTIAZEM HYDROCHLORIDE SCH MG: 30 TABLET, FILM COATED ORAL at 12:21

## 2019-04-03 RX ADMIN — DILTIAZEM HYDROCHLORIDE SCH MG: 30 TABLET, FILM COATED ORAL at 06:20

## 2019-04-03 RX ADMIN — IPRATROPIUM BROMIDE AND ALBUTEROL SULFATE SCH AMP: .5; 3 SOLUTION RESPIRATORY (INHALATION) at 12:21

## 2019-04-03 RX ADMIN — IPRATROPIUM BROMIDE AND ALBUTEROL SULFATE SCH AMP: .5; 3 SOLUTION RESPIRATORY (INHALATION) at 08:07

## 2019-04-03 NOTE — ECHO
______________________________________________________________________________



Name: COLLIN LAGUERRE                               Exam:Adult Echocardiogram

MRN: [id number]            Study Date: 2019 08:14 AM

Age: 83 yrs

______________________________________________________________________________



Reason For Study: NEW ONSET ATRIAL FIB

Height: 60 in        Weight: 140 lb        BSA: 1.6 m2



______________________________________________________________________________



MMode/2D Measurements & Calculations

IVSd: 0.79 cm                                      Ao root diam: 3.4 cm

LVIDd: 4.6 cm                                      LA dimension: 4.8 cm

LVIDs: 3.5 cm                                      ACS: 0.99 cm

LVPWd: 0.97 cm

IVSs: 0.68 cm



____________________________________________________

LVPWs: 0.93 cm                                     EDV(Teich): 98.0 ml

                                                   ESV(Teich): 51.1 ml



____________________________________________________

LVOT diam: 1.8 cm



Doppler Measurements & Calculations

MV E max deepak: 103.2 cm/sec                             Ao V2 max: 246.1 cm/sec

MV A max deepak: 72.8 cm/sec                              Ao max P.3 mmHg

MV E/A: 1.4                                            Ao V2 mean: 164.9 cm/sec

                                                       Ao mean P.7 mmHg

                                                       Ao V2 VTI: 56.4 cm



                                                       NAHEED(I,D): 0.75 cm2

                                                       AI P1/2t: 416.3 msec

                                                       NAHEED(V,D): 0.69 cm2

________________________________________________________



AI max deepak: 375.1 cm/sec                               LV V1 max P.8 mmHg

AI max P.3 mmHg                                   LV V1 mean P.0 mmHg

AI dec slope: 263.9 cm/sec2                            LV V1 max: 67.7 cm/sec

                                                       LV V1 mean: 49.7 cm/sec

                                                       LV V1 VTI: 16.8 cm

________________________________________________________



MR max deepak: 461.5 cm/sec                               SV(LVOT): 42.3 ml

MR max P.2 mmHg

________________________________________________________



TR max deepak: 262.0 cm/sec                               Med Peak E' Deepak: 5.8 cm/sec

TR max P.5 mmHg                                   Med E/e': 17.7

                                                       Lat Peak E' Deepak: 7.7 cm/sec

                                                       Lat E/e': 13.4



______________________________________________________________________________



Procedure

A two-dimensional transthoracic echocardiogram with color flow and Doppler was performed.

Left Ventricle

The left ventricular size, thickness and function are normal. The left ventricular ejection fraction 
is

normal. The transmitral spectral Doppler flow pattern is suggestive of pseudonormalization. Septal mo
tion is

consistent with post-operative state.

Right Ventricle

The right ventricle is normal in size and function.

Atria

The left atrium is moderately dilated. The right atrium is moderately dilated.

Mitral Valve

There is mild mitral valve thickening. There is no mitral valve stenosis. There is mild mitral regurg
itation.

Tricuspid Valve

There is mild tricuspid valve thickening. There is no tricuspid stenosis. There is mild tricuspid

regurgitation. Right ventricular systolic pressure is elevated at 30-40mmHg.

Aortic Valve

The aortic valve is trileaflet. There is moderate aortic valve thickening. There is moderate aortic

sclerosis.;. Hemodynamically significant valvular aortic stenosis cannot be excluded. Mild aortic

regurgitation.

Pulmonic Valve

The pulmonic valve is not well visualized.

Great Vessels

The aortic root is normal size.

Pericardium/Pleura

There is no pericardial effusion.

______________________________________________________________________________





Interpretation Summary

The left ventricular size, thickness and function are normal

The left ventricular ejection fraction is normal.

The left atrium is moderately dilated.

The right atrium is moderately dilated.

There is mild mitral regurgitation.

There is mild tricuspid regurgitation.

Right ventricular systolic pressure is elevated at 30-40mmHg.

The aortic valve is trileaflet.

There is moderate aortic valve thickening.

There is moderate aortic sclerosis.;

Hemodynamically significant valvular aortic stenosis cannot be excluded.

Septal motion is consistent with post-operative state.

The transmitral spectral Doppler flow pattern is suggestive of pseudonormalization.





MD Carlos Tristan 2019 11:30 AM

## 2019-04-03 NOTE — DS
Physical Exam: 


SUBJECTIVE: Patient seen and examined; no complaints; rate controlled








OBJECTIVE:





 Vital Signs











 Period  Temp  Pulse  Resp  BP Sys/May  Pulse Ox


 


 Last 24 Hr  98.0 F-98.6 F    14-20  110-135/56-74  93-98








PHYSICAL EXAM





GENERAL: The patient is awake, alert, and fully oriented, in no acute distress.


LUNGS: Breath sounds equal, clear to auscultation bilaterally, no wheezes, no 

crackles, no accessory muscle use. 


HEART: Regular rate and rhythm, S1, S2 without murmur, rub or gallop.


ABDOMEN: Soft, nontender, nondistended, normoactive bowel sounds, no guarding, 

no rebound, no hepatosplenomegaly, no masses.


EXTREMITIES: 2+ pulses, warm, well-perfused, no edema. 


NEUROLOGICAL: Cranial nerves II through XII grossly intact. Normal speech, gait 

not observed.


PSYCH: Normal mood, normal affect.


SKIN: Warm, dry, normal turgor, no rashes or lesions noted.





LABS


 Laboratory Results - last 24 hr











  04/02/19 04/02/19 04/02/19





  23:05 23:05 23:05


 


WBC   


 


RBC   


 


Hgb   


 


Hct   


 


MCV   


 


MCH   


 


MCHC   


 


RDW   


 


Plt Count   


 


MPV   


 


Absolute Neuts (auto)   


 


Neutrophils %   


 


Lymphocytes %   


 


Monocytes %   


 


Eosinophils %   


 


Basophils %   


 


Nucleated RBC %   


 


PT with INR   


 


INR   


 


PTT (Actin FS)   


 


Sodium   


 


Potassium   


 


Chloride   


 


Carbon Dioxide   


 


Anion Gap   


 


BUN   


 


Creatinine   


 


Creat Clearance w eGFR   


 


Random Glucose   


 


Calcium   


 


Phosphorus   


 


Magnesium   


 


Troponin I  < 0.02  


 


B-Natriuretic Peptide    3609.3 H


 


Triglycerides   


 


Cholesterol   


 


Total LDL Cholesterol   


 


HDL Cholesterol   


 


TSH   0.52 


 


Urine Color   


 


Urine Appearance   


 


Urine pH   


 


Ur Specific Gravity   


 


Urine Protein   


 


Urine Glucose (UA)   


 


Urine Ketones   


 


Urine Blood   


 


Urine Nitrite   


 


Urine Bilirubin   


 


Urine Urobilinogen   


 


Ur Leukocyte Esterase   


 


Urine WBC (Auto)   


 


Urine RBC (Auto)   


 


Urine Casts (Auto)   


 


U Epithel Cells (Auto)   


 


Urine Bacteria (Auto)   


 


U Random Total Protein   


 


Ur Random Sodium   


 


Ur Random Chloride   


 


Urine Creatinine   


 


Protein/Creatinin Ratio   


 


Influenza A (Rapid)   


 


Influenza B (Rapid)   














  04/03/19 04/03/19 04/03/19





  01:30 01:50 02:18


 


WBC   


 


RBC   


 


Hgb   


 


Hct   


 


MCV   


 


MCH   


 


MCHC   


 


RDW   


 


Plt Count   


 


MPV   


 


Absolute Neuts (auto)   


 


Neutrophils %   


 


Lymphocytes %   


 


Monocytes %   


 


Eosinophils %   


 


Basophils %   


 


Nucleated RBC %   


 


PT with INR   13.40 H 


 


INR   1.13 H 


 


PTT (Actin FS)    68.5 H


 


Sodium   


 


Potassium   


 


Chloride   


 


Carbon Dioxide   


 


Anion Gap   


 


BUN   


 


Creatinine   


 


Creat Clearance w eGFR   


 


Random Glucose   


 


Calcium   


 


Phosphorus   


 


Magnesium   


 


Troponin I   


 


B-Natriuretic Peptide   


 


Triglycerides   


 


Cholesterol   


 


Total LDL Cholesterol   


 


HDL Cholesterol   


 


TSH   


 


Urine Color  Yellow  


 


Urine Appearance  Clear  


 


Urine pH  5.0  


 


Ur Specific Gravity  1.019  


 


Urine Protein  1+ H  


 


Urine Glucose (UA)  Negative  


 


Urine Ketones  Trace H  


 


Urine Blood  Negative  


 


Urine Nitrite  Negative  


 


Urine Bilirubin  Negative  


 


Urine Urobilinogen  0.2  


 


Ur Leukocyte Esterase  Negative  


 


Urine WBC (Auto)  1  


 


Urine RBC (Auto)  2  


 


Urine Casts (Auto)  4  


 


U Epithel Cells (Auto)  0.5  


 


Urine Bacteria (Auto)  1.4  


 


U Random Total Protein   


 


Ur Random Sodium   


 


Ur Random Chloride   


 


Urine Creatinine   


 


Protein/Creatinin Ratio   


 


Influenza A (Rapid)   


 


Influenza B (Rapid)   














  04/03/19 04/03/19 04/03/19





  05:10 05:10 05:10


 


WBC  6.8  


 


RBC  4.14  


 


Hgb  12.1  


 


Hct  36.7  


 


MCV  88.7  


 


MCH  29.3  


 


MCHC  33.0  


 


RDW  13.2  


 


Plt Count  201  


 


MPV  11.1  


 


Absolute Neuts (auto)  5.6  


 


Neutrophils %  82.9 H D  


 


Lymphocytes %  14.8  D  


 


Monocytes %  2.2 L D  


 


Eosinophils %  0.0  D  


 


Basophils %  0.1  


 


Nucleated RBC %  0  


 


PT with INR   13.00 


 


INR   1.10 H 


 


PTT (Actin FS)   93.2 H 


 


Sodium    137


 


Potassium    4.6


 


Chloride    105


 


Carbon Dioxide    24


 


Anion Gap    8


 


BUN    30 H


 


Creatinine    2.0 H


 


Creat Clearance w eGFR    32.07


 


Random Glucose    154 H


 


Calcium    8.7


 


Phosphorus    3.7


 


Magnesium    2.2


 


Troponin I   


 


B-Natriuretic Peptide   


 


Triglycerides    75


 


Cholesterol    132


 


Total LDL Cholesterol    78


 


HDL Cholesterol    43


 


TSH    0.52


 


Urine Color   


 


Urine Appearance   


 


Urine pH   


 


Ur Specific Gravity   


 


Urine Protein   


 


Urine Glucose (UA)   


 


Urine Ketones   


 


Urine Blood   


 


Urine Nitrite   


 


Urine Bilirubin   


 


Urine Urobilinogen   


 


Ur Leukocyte Esterase   


 


Urine WBC (Auto)   


 


Urine RBC (Auto)   


 


Urine Casts (Auto)   


 


U Epithel Cells (Auto)   


 


Urine Bacteria (Auto)   


 


U Random Total Protein   


 


Ur Random Sodium   


 


Ur Random Chloride   


 


Urine Creatinine   


 


Protein/Creatinin Ratio   


 


Influenza A (Rapid)   


 


Influenza B (Rapid)   














  04/03/19 04/03/19 04/03/19





  08:11 12:30 13:24


 


WBC   


 


RBC   


 


Hgb   


 


Hct   


 


MCV   


 


MCH   


 


MCHC   


 


RDW   


 


Plt Count   


 


MPV   


 


Absolute Neuts (auto)   


 


Neutrophils %   


 


Lymphocytes %   


 


Monocytes %   


 


Eosinophils %   


 


Basophils %   


 


Nucleated RBC %   


 


PT with INR   


 


INR   


 


PTT (Actin FS)   74.7 H 


 


Sodium   


 


Potassium   


 


Chloride   


 


Carbon Dioxide   


 


Anion Gap   


 


BUN   


 


Creatinine   


 


Creat Clearance w eGFR   


 


Random Glucose   


 


Calcium   


 


Phosphorus   


 


Magnesium   


 


Troponin I   


 


B-Natriuretic Peptide   


 


Triglycerides   


 


Cholesterol   


 


Total LDL Cholesterol   


 


HDL Cholesterol   


 


TSH   


 


Urine Color   


 


Urine Appearance   


 


Urine pH   


 


Ur Specific Gravity   


 


Urine Protein   


 


Urine Glucose (UA)   


 


Urine Ketones   


 


Urine Blood   


 


Urine Nitrite   


 


Urine Bilirubin   


 


Urine Urobilinogen   


 


Ur Leukocyte Esterase   


 


Urine WBC (Auto)   


 


Urine RBC (Auto)   


 


Urine Casts (Auto)   


 


U Epithel Cells (Auto)   


 


Urine Bacteria (Auto)   


 


U Random Total Protein   


 


Ur Random Sodium    < 18 L


 


Ur Random Chloride    < 11 L


 


Urine Creatinine   


 


Protein/Creatinin Ratio   


 


Influenza A (Rapid)  Negative  


 


Influenza B (Rapid)  Negative  














  04/03/19 04/03/19





  13:24 13:24


 


WBC  


 


RBC  


 


Hgb  


 


Hct  


 


MCV  


 


MCH  


 


MCHC  


 


RDW  


 


Plt Count  


 


MPV  


 


Absolute Neuts (auto)  


 


Neutrophils %  


 


Lymphocytes %  


 


Monocytes %  


 


Eosinophils %  


 


Basophils %  


 


Nucleated RBC %  


 


PT with INR  


 


INR  


 


PTT (Actin FS)  


 


Sodium  


 


Potassium  


 


Chloride  


 


Carbon Dioxide  


 


Anion Gap  


 


BUN  


 


Creatinine  


 


Creat Clearance w eGFR  


 


Random Glucose  


 


Calcium  


 


Phosphorus  


 


Magnesium  


 


Troponin I  


 


B-Natriuretic Peptide  


 


Triglycerides  


 


Cholesterol  


 


Total LDL Cholesterol  


 


HDL Cholesterol  


 


TSH  


 


Urine Color  Yellow 


 


Urine Appearance  Clear 


 


Urine pH  5.0 


 


Ur Specific Gravity  1.022 


 


Urine Protein  Negative 


 


Urine Glucose (UA)  Negative 


 


Urine Ketones  Trace H 


 


Urine Blood  Negative 


 


Urine Nitrite  Negative 


 


Urine Bilirubin  Negative 


 


Urine Urobilinogen  1.0 


 


Ur Leukocyte Esterase  Negative 


 


Urine WBC (Auto)  


 


Urine RBC (Auto)  


 


Urine Casts (Auto)  


 


U Epithel Cells (Auto)  


 


Urine Bacteria (Auto)  


 


U Random Total Protein   19.4 H


 


Ur Random Sodium  


 


Ur Random Chloride  


 


Urine Creatinine   246.0


 


Protein/Creatinin Ratio   0.070


 


Influenza A (Rapid)  


 


Influenza B (Rapid)  











HOSPITAL COURSE:





Date of Admission:04/02/19





Date of Discharge: 04/03/19


This is a 83 year old male with a history of CABG, HTN, HLD, who presents with 

sob/cough with sputum production, found to be in rapid atrial fibrillation with 

RVR. Evaluated by cardiology. Started on heparin drip and rate controlled with 

cardizem 30mg po q6h. Sent home on eliquis 2.5mg daily and cardizem 240mg 

daily. 








Minutes to complete discharge: 35





Discharge Summary


Reason For Visit: NEW ONSET ATRIAL FRIBILLATION


Current Active Problems





Asthma (Acute)


Atrial fibrillation with RVR (Acute)


CKD (chronic kidney disease) (Acute)


HTN (hypertension) (Acute)


Hyperlipidemia (Acute)


New onset a-fib (Acute)








Condition: Stable





- Instructions


Diet, Activity, Other Instructions: 


Mr. Yuan, you have been evaluated for shortness of breath. You were found to 

have an irregular heart rhythm called atrial fibrillation. We are starting you 

on two new medications. Cardizem, is a blood pressure medication that controls 

your heart rate. Eliquis is a blood thinner, to help prevent strokes in people 

with atrial fibrillation.  





Medications:


START cardizem 240mg daily 


START eliquis 2.5 mg twice per day


STOP taking plavix


STOP taking amlodipine





continue all other prescribed mediations





Please follow up with your primary with in one week. Please follow up with the 

cardiologist that you have been referred to with in one week.





If you experience any worsening of symptoms, including chest pain, shortness of 

breath, leg swelling, bleeding, dark stools, please return to the emergency 

room.  


Referrals: 


Joseph Smiley MD [Staff Physician] - 


Disposition: HOME





- Home Medications


Comprehensive Discharge Medication List: 


Ambulatory Orders





Albuterol Sulfate Inhaler - [Ventolin HFA Inhaler -] 1 - 2 inh PO Q4H PRN 04/02/ 19 


Montelukast Sodium [Singulair] 10 mg PO DAILY 04/02/19 


Simvastatin 40 mg PO DAILY 04/02/19 


Tamsulosin HCl [Flomax] 0.4 mg PO DAILY 04/02/19 


Apixaban [Eliquis -] 2.5 mg PO BID 30 Days #60 tablet 04/03/19 


Diltiazem Cd [Cardizem Cd -] 240 mg PO DAILY #30 cap.cd.24h 04/03/19 








This patient is new to me today: Yes


Date on this admission: 04/03/19


Emergency Visit: Yes


ED Registration Date: 04/02/19


Care time: The patient presented to the Emergency Department on the above date 

and was hospitalized for further evaluation of their emergent condition.


Critical Care patient: No





- Discharge Referral


Referred to [facility] No

## 2019-04-03 NOTE — EKG
Test Reason : 

Blood Pressure : ***/*** mmHG

Vent. Rate : 061 BPM     Atrial Rate : 061 BPM

   P-R Int : 212 ms          QRS Dur : 082 ms

    QT Int : 446 ms       P-R-T Axes : 064 016 056 degrees

   QTc Int : 448 ms

 

SINUS RHYTHM WITH 1ST DEGREE A-V BLOCK

POSSIBLE INFERIOR INFARCT , AGE UNDETERMINED

ABNORMAL ECG

WHEN COMPARED WITH ECG OF 02-APR-2019 15:11,

SINUS RHYTHM HAS REPLACED ATRIAL FIBRILLATION

VENT. RATE HAS DECREASED BY  41 BPM

ST NO LONGER DEPRESSED IN ANTERIOR LEADS

NONSPECIFIC T WAVE ABNORMALITY NO LONGER EVIDENT IN ANTERIOR LEADS

QT HAS LENGTHENED

Confirmed by MARY CARMEN TAVARES, SALEEM ([id number]) on 4/3/2019 10:15:43 AM

 

Referred By:             Confirmed By:SALEEM LENTZ MD

## 2019-04-03 NOTE — EKG
Test Reason : 

Blood Pressure : ***/*** mmHG

Vent. Rate : 073 BPM     Atrial Rate : 468 BPM

   P-R Int : 000 ms          QRS Dur : 062 ms

    QT Int : 422 ms       P-R-T Axes : 000 025 031 degrees

   QTc Int : 464 ms

 

ATRIAL FIBRILLATION

NONSPECIFIC T WAVE ABNORMALITY

PROLONGED QT

ABNORMAL ECG

WHEN COMPARED WITH ECG OF 25-FEB-2018 08:02,

ATRIAL FIBRILLATION HAS REPLACED SINUS RHYTHM

NONSPECIFIC T WAVE ABNORMALITY NOW EVIDENT IN INFERIOR LEADS

NONSPECIFIC T WAVE ABNORMALITY, IMPROVED IN LATERAL LEADS

QT HAS LENGTHENED

Confirmed by MARY CARMEN TAVARES, SALEEM ([id number]) on 4/3/2019 10:13:09 AM

 

Referred By:             Confirmed By:SALEEM LENTZ MD

## 2019-04-03 NOTE — EKG
Test Reason : 

Blood Pressure : ***/*** mmHG

Vent. Rate : 102 BPM     Atrial Rate : 416 BPM

   P-R Int : 000 ms          QRS Dur : 064 ms

    QT Int : 272 ms       P-R-T Axes : 000 018 -84 degrees

   QTc Int : 354 ms

 

ATRIAL FIBRILLATION WITH RAPID VENTRICULAR RESPONSE

NONSPECIFIC ST AND T WAVE ABNORMALITY

ABNORMAL ECG

WHEN COMPARED WITH ECG OF 25-FEB-2018 08:02,

ATRIAL FIBRILLATION HAS REPLACED SINUS RHYTHM

ST NOW DEPRESSED IN ANTERIOR LEADS

NONSPECIFIC T WAVE ABNORMALITY NOW EVIDENT IN INFERIOR LEADS

NONSPECIFIC T WAVE ABNORMALITY NOW EVIDENT IN ANTERIOR LEADS

Confirmed by MARY CARMEN TAVARES, SALEEM ([id number]) on 4/3/2019 10:15:20 AM

 

Referred By:             Confirmed By:ASLEEM LENTZ MD

## 2019-04-03 NOTE — PN
Teaching Attending Note


Name of Resident: Oliva Mora





ATTENDING PHYSICIAN STATEMENT





I saw and evaluated the patient.


I reviewed the resident's note and discussed the case with the resident.


I agree with the resident's findings and plan as documented.








SUBJECTIVE: Mr Lissy Gunter is without complaint today. Denies cp, sob, n/v








OBJECTIVE:


 Last Vital Signs











Temp Pulse Resp BP Pulse Ox


 


 36.9 C   65   16   115/56 L  94 L


 


 04/03/19 06:20  04/03/19 06:20  04/03/19 06:20  04/03/19 06:20  04/03/19 06:20








Gen: nad


Pulm: ctab but with slight tightness in chest but no w/r/r, not requiring oxygen


CV: irreg irreg but rate controlled w/o m/r/g


Abd: +bs, s/nt/nd


Ext: no c/c/e





 CBC, BMP





 04/03/19 05:10 





 04/03/19 05:10 











ASSESSMENT AND PLAN:








Problem List





- Problems


(1) Atrial fibrillation with RVR


Assessment/Plan: 


-rate controlled


-cardiology following


-currently on diltiazem 30mg q6h


-can change to cardizem cd 240mg daily on discharge


-on heparin gtt


-consider eliquis 2.5mg bid since over 80 and Cr over 1.5


-ECHO being performed, follow up results


-possible discharge today if ECHO normal


Code(s): I48.91 - UNSPECIFIED ATRIAL FIBRILLATION   





(2) HTN (hypertension)


Assessment/Plan: 


-continue cardizem


Code(s): I10 - ESSENTIAL (PRIMARY) HYPERTENSION   





(3) Hyperlipidemia


Assessment/Plan: 


-continue lipitor


Code(s): E78.5 - HYPERLIPIDEMIA, UNSPECIFIED   





(4) CKD (chronic kidney disease)


Assessment/Plan: 


-baseline


Code(s): N18.9 - CHRONIC KIDNEY DISEASE, UNSPECIFIED   


Qualifiers: 


   Chronic kidney disease stage: stage 3 (moderate)   Qualified Code(s): N18.3 

- Chronic kidney disease, stage 3 (moderate)   





(5) Asthma


Assessment/Plan: 


-on prednisone for a short course


-continue duonebs


-controlled


Code(s): J45.909 - UNSPECIFIED ASTHMA, UNCOMPLICATED

## 2019-04-03 NOTE — PN
Progress Note, Physician


History of Present Illness: 





Pt is an 82 yo man with h/o CAD s/p PCI, stent, CABG, Asthma, BPH who present 

with a complaint of cough which has been present for the past 3 days


No chest pain, fevers, chills


No palpitations


Pt denies orthopnea


Pt denies dyspnea on exertion


Pt travelled from [place] 1 month ago








- Current Medication List


Current Medications: 


Active Medications





Albuterol Sulfate (Ventolin 0.083% Nebulizer Soln -)  1 amp NEB Q1H PRN


   PRN Reason: SHORT OF BREATH/WHEEZING


Albuterol/Ipratropium (Duoneb -)  1 amp NEB RQID [facility]


   Last Admin: 04/03/19 08:07 Dose:  1 amp


Atorvastatin Calcium (Lipitor -)  20 mg PO HS [facility]


Diltiazem HCl (Cardizem -)  30 mg PO Q6HPO [facility]


   Last Admin: 04/03/19 06:20 Dose:  30 mg


Diltiazem HCl (Cardizem Injection -)  5 mg IVPUSH Q4H PRN


   PRN Reason: TACHYCARDIA


Heparin Sodium (Porcine) (Heparin -)  1,000 unit IVPUSH PRN PRN


   PRN Reason: Heparin


Heparin Sodium (Porcine) (Heparin -)  5,000 unit IVPUSH PRN PRN


   PRN Reason: Heparin


HEPARIN SOD,PORK IN 0.45% NACL (Heparin-1/2ns 25,000 Units/500)  25,000 unit in 

500 mls @ 16 mls/hr IVPB TITR [facility]; Protocol


   Last Titration: 04/03/19 06:51 Dose:  650 units/hr, 13 mls/hr


Montelukast Sodium (Singulair -)  10 mg PO DAILY [facility]


   Last Admin: 04/03/19 10:15 Dose:  10 mg











- Objective


Vital Signs: 


 Vital Signs











Temperature  98.4 F   04/03/19 06:20


 


Pulse Rate  65   04/03/19 06:20


 


Respiratory Rate  16   04/03/19 06:20


 


Blood Pressure  115/56 L  04/03/19 06:20


 


O2 Sat by Pulse Oximetry (%)  94 L  04/03/19 06:20











Eyes: Yes: WNL, Conjunctiva Clear, EOM Intact


HENT: Yes: WNL, Atraumatic, Normocephalic


Neck: Yes: WNL, Supple, Trachea Midline


Cardiovascular: Yes: Pulse Irregular, S1, S2


Respiratory: Yes: WNL, Regular, CTA Bilaterally


Gastrointestinal: Yes: WNL, Normal Bowel Sounds


Genitourinary: Yes: WNL


Musculoskeletal: Yes: WNL


Extremities: Yes: WNL


Edema: No


Integumentary: Yes: WNL


Neurological: Yes: WNL, Alert, Oriented


...Motor Strength: WNL


Psychiatric: Yes: WNL


Labs: 


 CBC, BMP





 04/03/19 05:10 





 04/03/19 05:10 





 INR, PTT











INR  1.10  (0.83-1.09)  H  04/03/19  05:10    














Assessment/Plan








- Problems


(1) HTN (hypertension)


Assessment/Plan: 


on diltiazem for HR and BP control.


F/u ECHO for LVEF, Wall thickness.


Code(s): I10 - ESSENTIAL (PRIMARY) HYPERTENSION   





(2) New onset a-fib


Assessment/Plan: 


On diltiazem for HR control.


On IV heparin for anticoagulation.


ECHO for LVEF, wall motion and thickness, chamber sizes, valve status.


1st TNI < 0.02.


F/u EKG; telemetry.


Code(s): I48.91 - UNSPECIFIED ATRIAL FIBRILLATION   





(3) Cough


Code(s): R05 - COUGH   





(4) Asthma


Assessment/Plan: 


remote history


Code(s): J45.909 - UNSPECIFIED ASTHMA, UNCOMPLICATED   





(5) Hyperlipidemia


Assessment/Plan: 


on statin.


F/u lipid profile.


Code(s): E78.5 - HYPERLIPIDEMIA, UNSPECIFIED

## 2024-11-14 ENCOUNTER — HOSPITAL ENCOUNTER (INPATIENT)
Dept: HOSPITAL 74 - JER | Age: 89
LOS: 7 days | Discharge: HOME HEALTH | DRG: 330 | End: 2024-11-21
Attending: STUDENT IN AN ORGANIZED HEALTH CARE EDUCATION/TRAINING PROGRAM | Admitting: STUDENT IN AN ORGANIZED HEALTH CARE EDUCATION/TRAINING PROGRAM
Payer: MEDICARE

## 2024-11-14 VITALS — BODY MASS INDEX: 27.9 KG/M2

## 2024-11-14 DIAGNOSIS — Z79.01: ICD-10-CM

## 2024-11-14 DIAGNOSIS — K63.1: Primary | ICD-10-CM

## 2024-11-14 DIAGNOSIS — I12.9: ICD-10-CM

## 2024-11-14 DIAGNOSIS — N18.9: ICD-10-CM

## 2024-11-14 DIAGNOSIS — K63.0: ICD-10-CM

## 2024-11-14 DIAGNOSIS — E78.5: ICD-10-CM

## 2024-11-14 DIAGNOSIS — I48.91: ICD-10-CM

## 2024-11-14 DIAGNOSIS — I25.10: ICD-10-CM

## 2024-11-14 DIAGNOSIS — N40.0: ICD-10-CM

## 2024-11-14 DIAGNOSIS — J45.909: ICD-10-CM

## 2024-11-14 DIAGNOSIS — K66.8: ICD-10-CM

## 2024-11-14 LAB
ALBUMIN SERPL-MCNC: 3.2 G/DL (ref 3.4–5)
ALP SERPL-CCNC: 116 U/L (ref 45–117)
ALT SERPL-CCNC: 35 U/L (ref 13–61)
ANION GAP SERPL CALC-SCNC: 8 MMOL/L (ref 4–13)
APPEARANCE UR: CLEAR
APTT BLD: 33.5 SECONDS (ref 25.2–36.5)
AST SERPL-CCNC: 41 U/L (ref 15–37)
BACTERIA # UR AUTO: 18 /UL (ref 0–1359)
BASOPHILS # BLD: 0.6 % (ref 0–2)
BILIRUB SERPL-MCNC: 1.4 MG/DL (ref 0.2–1)
BILIRUB UR STRIP.AUTO-MCNC: NEGATIVE MG/DL
BUN SERPL-MCNC: 21.6 MG/DL (ref 7–18)
CALCIUM SERPL-MCNC: 9 MG/DL (ref 8.5–10.1)
CASTS URNS QL MICRO: 1 /UL (ref 0–3.1)
CHLORIDE SERPL-SCNC: 106 MMOL/L (ref 98–107)
CO2 SERPL-SCNC: 26 MMOL/L (ref 21–32)
COLOR UR: YELLOW
CREAT SERPL-MCNC: 1.9 MG/DL (ref 0.55–1.3)
DEPRECATED RDW RBC AUTO: 13.8 % (ref 11.9–15.9)
EOSINOPHIL # BLD: 0.7 % (ref 0–4.5)
EPITH CASTS URNS QL MICRO: 12 /UL (ref 0–25.1)
GLUCOSE SERPL-MCNC: 123 MG/DL (ref 74–106)
HCT VFR BLD CALC: 41 % (ref 35.4–49)
HGB BLD-MCNC: 13.5 GM/DL (ref 11.7–16.9)
INR BLD: 1.22 (ref 0.83–1.09)
KETONES UR QL STRIP: NEGATIVE
LEUKOCYTE ESTERASE UR QL STRIP.AUTO: NEGATIVE
LYMPHOCYTES # BLD: 17 % (ref 8–40)
MCH RBC QN AUTO: 29.6 PG (ref 25.7–33.7)
MCHC RBC AUTO-ENTMCNC: 32.8 G/DL (ref 32–35.9)
MCV RBC: 90.3 FL (ref 80–96)
MONOCYTES # BLD AUTO: 15.4 % (ref 3.8–10.2)
NEUTROPHILS # BLD: 66.3 % (ref 42.8–82.8)
NITRITE UR QL STRIP: NEGATIVE
PH UR: 5.5 [PH] (ref 5–8)
PLATELET # BLD AUTO: 151 10^3/UL (ref 134–434)
PMV BLD: 10.4 FL (ref 7.5–11.1)
POTASSIUM SERPLBLD-SCNC: 4.7 MMOL/L (ref 3.5–5.1)
PROT SERPL-MCNC: 6.9 G/DL (ref 6.4–8.2)
PROT UR QL STRIP: (no result)
PROT UR QL STRIP: NEGATIVE
PT PNL PPP: 14 SEC (ref 9.7–13)
RBC # BLD AUTO: 19 /UL (ref 0–23.9)
RBC # BLD AUTO: 4.54 M/MM3 (ref 4–5.6)
SODIUM SERPL-SCNC: 140 MMOL/L (ref 136–145)
SP GR UR: 1.02 (ref 1.01–1.03)
UROBILINOGEN UR STRIP-MCNC: 1 MG/DL (ref 0.2–1)
WBC # BLD AUTO: 13 K/MM3 (ref 4–10)
WBC # UR AUTO: 34 /UL (ref 0–25.8)

## 2024-11-14 PROCEDURE — E0186 AIR PRESSURE MATTRESS: HCPCS

## 2024-11-14 PROCEDURE — 0DT80ZZ RESECTION OF SMALL INTESTINE, OPEN APPROACH: ICD-10-PCS | Performed by: SURGERY

## 2024-11-14 PROCEDURE — 3E1M38Z IRRIGATION OF PERITONEAL CAVITY USING IRRIGATING SUBSTANCE, PERCUTANEOUS APPROACH: ICD-10-PCS | Performed by: SURGERY

## 2024-11-14 RX ADMIN — ALBUMIN (HUMAN) ONE GM: 12.5 INJECTION, SOLUTION INTRAVENOUS at 13:45

## 2024-11-14 RX ADMIN — MUPIROCIN SCH APPLIC: 20 OINTMENT TOPICAL at 21:35

## 2024-11-14 RX ADMIN — ACETAMINOPHEN ONE MG: 10 INJECTION, SOLUTION INTRAVENOUS at 16:47

## 2024-11-14 RX ADMIN — SODIUM CHLORIDE, POTASSIUM CHLORIDE, SODIUM LACTATE AND CALCIUM CHLORIDE SCH MLS/HR: 600; 310; 30; 20 INJECTION, SOLUTION INTRAVENOUS at 19:40

## 2024-11-14 RX ADMIN — ACETAMINOPHEN ONE MG: 10 INJECTION, SOLUTION INTRAVENOUS at 05:57

## 2024-11-14 RX ADMIN — CHLORHEXIDINE GLUCONATE SCH APPLIC: 213 SOLUTION TOPICAL at 21:35

## 2024-11-14 RX ADMIN — SODIUM CHLORIDE STA MLS/HR: 9 INJECTION, SOLUTION INTRAVENOUS at 07:41

## 2024-11-14 RX ADMIN — VANCOMYCIN ONE: 1.5 INJECTION, SOLUTION INTRAVENOUS at 17:54

## 2024-11-14 RX ADMIN — CEFEPIME HYDROCHLORIDE ONE GM: 1 INJECTION, POWDER, FOR SOLUTION INTRAMUSCULAR; INTRAVENOUS at 10:20

## 2024-11-15 LAB
ANION GAP SERPL CALC-SCNC: 10 MMOL/L (ref 4–13)
BUN SERPL-MCNC: 23 MG/DL (ref 7–18)
CALCIUM SERPL-MCNC: 8.2 MG/DL (ref 8.5–10.1)
CHLORIDE SERPL-SCNC: 108 MMOL/L (ref 98–107)
CO2 SERPL-SCNC: 21 MMOL/L (ref 21–32)
CREAT SERPL-MCNC: 1.8 MG/DL (ref 0.55–1.3)
DEPRECATED RDW RBC AUTO: 13.7 % (ref 11.9–15.9)
GLUCOSE SERPL-MCNC: 166 MG/DL (ref 74–106)
HCT VFR BLD CALC: 36 % (ref 35.4–49)
HGB BLD-MCNC: 11.9 GM/DL (ref 11.7–16.9)
MCH RBC QN AUTO: 29.4 PG (ref 25.7–33.7)
MCHC RBC AUTO-ENTMCNC: 33.2 G/DL (ref 32–35.9)
MCV RBC: 88.6 FL (ref 80–96)
PLATELET # BLD AUTO: 156 10^3/UL (ref 134–434)
PMV BLD: 10.8 FL (ref 7.5–11.1)
POTASSIUM SERPLBLD-SCNC: 5.2 MMOL/L (ref 3.5–5.1)
RBC # BLD AUTO: 4.06 M/MM3 (ref 4–5.6)
SODIUM SERPL-SCNC: 139 MMOL/L (ref 136–145)
WBC # BLD AUTO: 18.5 K/MM3 (ref 4–10)

## 2024-11-15 RX ADMIN — ACETAMINOPHEN SCH MG: 10 INJECTION, SOLUTION INTRAVENOUS at 09:46

## 2024-11-15 RX ADMIN — HEPARIN SODIUM SCH UNIT: 5000 INJECTION, SOLUTION INTRAVENOUS; SUBCUTANEOUS at 14:28

## 2024-11-15 RX ADMIN — ERTAPENEM SODIUM SCH MLS/HR: 1 INJECTION, POWDER, LYOPHILIZED, FOR SOLUTION INTRAMUSCULAR; INTRAVENOUS at 21:46

## 2024-11-15 RX ADMIN — CEFTRIAXONE ONE MLS/HR: 2 INJECTION, SOLUTION INTRAVENOUS at 10:20

## 2024-11-16 LAB
ALBUMIN SERPL-MCNC: 2.4 G/DL (ref 3.4–5)
ALP SERPL-CCNC: 75 U/L (ref 45–117)
ALT SERPL-CCNC: 22 U/L (ref 13–61)
ANION GAP SERPL CALC-SCNC: 6 MMOL/L (ref 4–13)
AST SERPL-CCNC: 26 U/L (ref 15–37)
BASOPHILS # BLD: 0.2 % (ref 0–2)
BILIRUB SERPL-MCNC: 0.4 MG/DL (ref 0.2–1)
BUN SERPL-MCNC: 28.8 MG/DL (ref 7–18)
CALCIUM SERPL-MCNC: 7.9 MG/DL (ref 8.5–10.1)
CHLORIDE SERPL-SCNC: 108 MMOL/L (ref 98–107)
CO2 SERPL-SCNC: 24 MMOL/L (ref 21–32)
CREAT SERPL-MCNC: 1.7 MG/DL (ref 0.55–1.3)
DEPRECATED RDW RBC AUTO: 13.6 % (ref 11.9–15.9)
EOSINOPHIL # BLD: 0 % (ref 0–4.5)
GLUCOSE SERPL-MCNC: 88 MG/DL (ref 74–106)
HCT VFR BLD CALC: 32.7 % (ref 35.4–49)
HGB BLD-MCNC: 10.8 GM/DL (ref 11.7–16.9)
LYMPHOCYTES # BLD: 6.3 % (ref 8–40)
MAGNESIUM SERPL-MCNC: 1.9 MG/DL (ref 1.8–2.4)
MCH RBC QN AUTO: 29.6 PG (ref 25.7–33.7)
MCHC RBC AUTO-ENTMCNC: 33.2 G/DL (ref 32–35.9)
MCV RBC: 89.1 FL (ref 80–96)
MONOCYTES # BLD AUTO: 11.6 % (ref 3.8–10.2)
NEUTROPHILS # BLD: 81.9 % (ref 42.8–82.8)
PHOSPHATE SERPL-MCNC: 4.7 MG/DL (ref 2.5–4.9)
PLATELET # BLD AUTO: 152 10^3/UL (ref 134–434)
PMV BLD: 11.4 FL (ref 7.5–11.1)
POTASSIUM SERPLBLD-SCNC: 5 MMOL/L (ref 3.5–5.1)
PROT SERPL-MCNC: 5.1 G/DL (ref 6.4–8.2)
RBC # BLD AUTO: 3.67 M/MM3 (ref 4–5.6)
SODIUM SERPL-SCNC: 138 MMOL/L (ref 136–145)
WBC # BLD AUTO: 18.5 K/MM3 (ref 4–10)

## 2024-11-16 RX ADMIN — ERTAPENEM SODIUM SCH: 1 INJECTION, POWDER, LYOPHILIZED, FOR SOLUTION INTRAMUSCULAR; INTRAVENOUS at 11:13

## 2024-11-16 RX ADMIN — HEPARIN SODIUM SCH UNIT: 5000 INJECTION, SOLUTION INTRAVENOUS; SUBCUTANEOUS at 22:39

## 2024-11-16 RX ADMIN — SODIUM CHLORIDE, POTASSIUM CHLORIDE, SODIUM LACTATE AND CALCIUM CHLORIDE SCH: 600; 310; 30; 20 INJECTION, SOLUTION INTRAVENOUS at 18:30

## 2024-11-16 RX ADMIN — METOPROLOL TARTRATE PRN MG: 5 INJECTION, SOLUTION INTRAVENOUS at 13:36

## 2024-11-16 RX ADMIN — ACETAMINOPHEN SCH MG: 10 INJECTION, SOLUTION INTRAVENOUS at 22:38

## 2024-11-17 LAB
ALBUMIN SERPL-MCNC: 2.3 G/DL (ref 3.4–5)
ALP SERPL-CCNC: 96 U/L (ref 45–117)
ALT SERPL-CCNC: 21 U/L (ref 13–61)
ANION GAP SERPL CALC-SCNC: 6 MMOL/L (ref 4–13)
AST SERPL-CCNC: 25 U/L (ref 15–37)
BASOPHILS # BLD: 0.4 % (ref 0–2)
BILIRUB SERPL-MCNC: 0.6 MG/DL (ref 0.2–1)
BUN SERPL-MCNC: 34.1 MG/DL (ref 7–18)
CALCIUM SERPL-MCNC: 8.2 MG/DL (ref 8.5–10.1)
CHLORIDE SERPL-SCNC: 106 MMOL/L (ref 98–107)
CO2 SERPL-SCNC: 25 MMOL/L (ref 21–32)
CREAT SERPL-MCNC: 1.5 MG/DL (ref 0.55–1.3)
DEPRECATED RDW RBC AUTO: 13.9 % (ref 11.9–15.9)
EOSINOPHIL # BLD: 0.5 % (ref 0–4.5)
GLUCOSE SERPL-MCNC: 76 MG/DL (ref 74–106)
HCT VFR BLD CALC: 34.1 % (ref 35.4–49)
HGB BLD-MCNC: 11.1 GM/DL (ref 11.7–16.9)
LYMPHOCYTES # BLD: 10.5 % (ref 8–40)
MAGNESIUM SERPL-MCNC: 2 MG/DL (ref 1.8–2.4)
MCH RBC QN AUTO: 29.4 PG (ref 25.7–33.7)
MCHC RBC AUTO-ENTMCNC: 32.7 G/DL (ref 32–35.9)
MCV RBC: 89.8 FL (ref 80–96)
MONOCYTES # BLD AUTO: 8.4 % (ref 3.8–10.2)
NEUTROPHILS # BLD: 80.2 % (ref 42.8–82.8)
PLATELET # BLD AUTO: 190 10^3/UL (ref 134–434)
PMV BLD: 10.3 FL (ref 7.5–11.1)
POTASSIUM SERPLBLD-SCNC: 4.9 MMOL/L (ref 3.5–5.1)
PROT SERPL-MCNC: 5.2 G/DL (ref 6.4–8.2)
RBC # BLD AUTO: 3.79 M/MM3 (ref 4–5.6)
SODIUM SERPL-SCNC: 137 MMOL/L (ref 136–145)
WBC # BLD AUTO: 16 K/MM3 (ref 4–10)

## 2024-11-17 RX ADMIN — ERTAPENEM SODIUM SCH MLS/HR: 1 INJECTION, POWDER, LYOPHILIZED, FOR SOLUTION INTRAMUSCULAR; INTRAVENOUS at 10:43

## 2024-11-18 LAB
ALBUMIN SERPL-MCNC: 2.4 G/DL (ref 3.4–5)
ALP SERPL-CCNC: 139 U/L (ref 45–117)
ALT SERPL-CCNC: 36 U/L (ref 13–61)
ANION GAP SERPL CALC-SCNC: 13 MMOL/L (ref 4–13)
ANISOCYTOSIS BLD QL: 0
AST SERPL-CCNC: 55 U/L (ref 15–37)
BASO STIPL BLD QL SMEAR: 0
BILIRUB SERPL-MCNC: 0.6 MG/DL (ref 0.2–1)
BUN SERPL-MCNC: 32.2 MG/DL (ref 7–18)
CALCIUM SERPL-MCNC: 8.2 MG/DL (ref 8.5–10.1)
CHLORIDE SERPL-SCNC: 106 MMOL/L (ref 98–107)
CO2 SERPL-SCNC: 23 MMOL/L (ref 21–32)
CREAT SERPL-MCNC: 1.3 MG/DL (ref 0.55–1.3)
DACRYOCYTES BLD QL SMEAR: 0
DEPRECATED RDW RBC AUTO: 14.1 % (ref 11.9–15.9)
DOHLE BOD BLD QL SMEAR: 0
GLUCOSE SERPL-MCNC: 103 MG/DL (ref 74–106)
HCT VFR BLD CALC: 37.2 % (ref 35.4–49)
HELMET CELLS BLD QL SMEAR: 0
HGB BLD-MCNC: 12.4 GM/DL (ref 11.7–16.9)
HOWELL-JOLLY BOD BLD QL SMEAR: 0
MACROCYTES BLD QL: 0
MAGNESIUM SERPL-MCNC: 2 MG/DL (ref 1.8–2.4)
MCH RBC QN AUTO: 29.4 PG (ref 25.7–33.7)
MCHC RBC AUTO-ENTMCNC: 33.4 G/DL (ref 32–35.9)
MCV RBC: 88.2 FL (ref 80–96)
OVALOCYTES BLD QL SMEAR: 0
PLATELET # BLD AUTO: 235 10^3/UL (ref 134–434)
PMV BLD: 10.3 FL (ref 7.5–11.1)
POTASSIUM SERPLBLD-SCNC: 4.4 MMOL/L (ref 3.5–5.1)
PROT SERPL-MCNC: 5.3 G/DL (ref 6.4–8.2)
RBC # BLD AUTO: 4.22 M/MM3 (ref 4–5.6)
ROULEAUX BLD QL SMEAR: 0
SICKLE CELLS BLD QL SMEAR: 0
SODIUM SERPL-SCNC: 141 MMOL/L (ref 136–145)
TARGETS BLD QL SMEAR: 0
TOXIC GRANULES BLD QL SMEAR: 0
WBC # BLD AUTO: 14.4 K/MM3 (ref 4–10)

## 2024-11-18 RX ADMIN — VANCOMYCIN ONE: 1.5 INJECTION, SOLUTION INTRAVENOUS at 10:01

## 2024-11-18 RX ADMIN — ALBUMIN (HUMAN) ONE: 12.5 INJECTION, SOLUTION INTRAVENOUS at 10:01

## 2024-11-19 LAB
ALBUMIN SERPL-MCNC: 2.4 G/DL (ref 3.4–5)
ALP SERPL-CCNC: 163 U/L (ref 45–117)
ALT SERPL-CCNC: 37 U/L (ref 13–61)
ANION GAP SERPL CALC-SCNC: 6 MMOL/L (ref 4–13)
ANISOCYTOSIS BLD QL: (no result)
AST SERPL-CCNC: 61 U/L (ref 15–37)
BILIRUB SERPL-MCNC: 0.6 MG/DL (ref 0.2–1)
BUN SERPL-MCNC: 25.6 MG/DL (ref 7–18)
CALCIUM SERPL-MCNC: 8.2 MG/DL (ref 8.5–10.1)
CHLORIDE SERPL-SCNC: 106 MMOL/L (ref 98–107)
CO2 SERPL-SCNC: 26 MMOL/L (ref 21–32)
CREAT SERPL-MCNC: 1.3 MG/DL (ref 0.55–1.3)
DEPRECATED RDW RBC AUTO: 14.2 % (ref 11.9–15.9)
GLUCOSE SERPL-MCNC: 95 MG/DL (ref 74–106)
HCT VFR BLD CALC: 37 % (ref 35.4–49)
HGB BLD-MCNC: 12.4 GM/DL (ref 11.7–16.9)
MACROCYTES BLD QL: (no result)
MAGNESIUM SERPL-MCNC: 2 MG/DL (ref 1.8–2.4)
MCH RBC QN AUTO: 29.5 PG (ref 25.7–33.7)
MCHC RBC AUTO-ENTMCNC: 33.6 G/DL (ref 32–35.9)
MCV RBC: 87.9 FL (ref 80–96)
PLATELET # BLD AUTO: 292 10^3/UL (ref 134–434)
PMV BLD: 10.1 FL (ref 7.5–11.1)
POTASSIUM SERPLBLD-SCNC: 4.5 MMOL/L (ref 3.5–5.1)
PROT SERPL-MCNC: 5.4 G/DL (ref 6.4–8.2)
RBC # BLD AUTO: 4.21 M/MM3 (ref 4–5.6)
SODIUM SERPL-SCNC: 138 MMOL/L (ref 136–145)
WBC # BLD AUTO: 12.4 K/MM3 (ref 4–10)

## 2024-11-19 RX ADMIN — BACITRACIN ZINC SCH APPLIC: 500 OINTMENT TOPICAL at 10:47

## 2024-11-21 VITALS
DIASTOLIC BLOOD PRESSURE: 81 MMHG | RESPIRATION RATE: 20 BRPM | SYSTOLIC BLOOD PRESSURE: 135 MMHG | TEMPERATURE: 98.2 F | HEART RATE: 72 BPM